# Patient Record
Sex: MALE | Race: WHITE | NOT HISPANIC OR LATINO | URBAN - METROPOLITAN AREA
[De-identification: names, ages, dates, MRNs, and addresses within clinical notes are randomized per-mention and may not be internally consistent; named-entity substitution may affect disease eponyms.]

---
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
## 2018-07-31 ENCOUNTER — OUTPATIENT (OUTPATIENT)
Dept: OUTPATIENT SERVICES | Facility: HOSPITAL | Age: 18
LOS: 1 days | Discharge: HOME | End: 2018-07-31

## 2018-07-31 DIAGNOSIS — Z00.00 ENCOUNTER FOR GENERAL ADULT MEDICAL EXAMINATION WITHOUT ABNORMAL FINDINGS: ICD-10-CM

## 2025-04-02 VITALS
TEMPERATURE: 98 F | RESPIRATION RATE: 20 BRPM | WEIGHT: 214.95 LBS | SYSTOLIC BLOOD PRESSURE: 142 MMHG | DIASTOLIC BLOOD PRESSURE: 96 MMHG | OXYGEN SATURATION: 99 % | HEART RATE: 119 BPM

## 2025-04-02 PROCEDURE — 99285 EMERGENCY DEPT VISIT HI MDM: CPT

## 2025-04-02 NOTE — ED ADULT TRIAGE NOTE - CHIEF COMPLAINT QUOTE
BIBEMS from home for first time grand mal seizure. as per family pt. fell out of bed, -ac, -HT.  no hx of seizures. denies any drug or alcohol use

## 2025-04-03 ENCOUNTER — OUTPATIENT (OUTPATIENT)
Dept: EMERGENCY DEPT | Facility: HOSPITAL | Age: 25
LOS: 1 days | Discharge: HOME | DRG: 101 | End: 2025-04-03
Payer: MEDICAID

## 2025-04-03 DIAGNOSIS — R56.9 UNSPECIFIED CONVULSIONS: ICD-10-CM

## 2025-04-03 LAB
ALBUMIN SERPL ELPH-MCNC: 4.7 G/DL — SIGNIFICANT CHANGE UP (ref 3.5–5.2)
ALBUMIN SERPL ELPH-MCNC: 4.7 G/DL — SIGNIFICANT CHANGE UP (ref 3.5–5.2)
ALP SERPL-CCNC: 58 U/L — SIGNIFICANT CHANGE UP (ref 30–115)
ALP SERPL-CCNC: 62 U/L — SIGNIFICANT CHANGE UP (ref 30–115)
ALT FLD-CCNC: 19 U/L — SIGNIFICANT CHANGE UP (ref 0–41)
ALT FLD-CCNC: 20 U/L — SIGNIFICANT CHANGE UP (ref 0–41)
ANION GAP SERPL CALC-SCNC: 10 MMOL/L — SIGNIFICANT CHANGE UP (ref 7–14)
ANION GAP SERPL CALC-SCNC: 14 MMOL/L — SIGNIFICANT CHANGE UP (ref 7–14)
AST SERPL-CCNC: 18 U/L — SIGNIFICANT CHANGE UP (ref 0–41)
AST SERPL-CCNC: 21 U/L — SIGNIFICANT CHANGE UP (ref 0–41)
BASOPHILS # BLD AUTO: 0.04 K/UL — SIGNIFICANT CHANGE UP (ref 0–0.2)
BASOPHILS # BLD AUTO: 0.04 K/UL — SIGNIFICANT CHANGE UP (ref 0–0.2)
BASOPHILS NFR BLD AUTO: 0.3 % — SIGNIFICANT CHANGE UP (ref 0–1)
BASOPHILS NFR BLD AUTO: 0.6 % — SIGNIFICANT CHANGE UP (ref 0–1)
BILIRUB SERPL-MCNC: 0.7 MG/DL — SIGNIFICANT CHANGE UP (ref 0.2–1.2)
BILIRUB SERPL-MCNC: 0.8 MG/DL — SIGNIFICANT CHANGE UP (ref 0.2–1.2)
BUN SERPL-MCNC: 12 MG/DL — SIGNIFICANT CHANGE UP (ref 10–20)
BUN SERPL-MCNC: 8 MG/DL — LOW (ref 10–20)
CALCIUM SERPL-MCNC: 8.8 MG/DL — SIGNIFICANT CHANGE UP (ref 8.4–10.5)
CALCIUM SERPL-MCNC: 9 MG/DL — SIGNIFICANT CHANGE UP (ref 8.4–10.5)
CHLORIDE SERPL-SCNC: 104 MMOL/L — SIGNIFICANT CHANGE UP (ref 98–110)
CHLORIDE SERPL-SCNC: 106 MMOL/L — SIGNIFICANT CHANGE UP (ref 98–110)
CK SERPL-CCNC: 1533 U/L — HIGH (ref 0–225)
CO2 SERPL-SCNC: 21 MMOL/L — SIGNIFICANT CHANGE UP (ref 17–32)
CO2 SERPL-SCNC: 26 MMOL/L — SIGNIFICANT CHANGE UP (ref 17–32)
CREAT SERPL-MCNC: 0.8 MG/DL — SIGNIFICANT CHANGE UP (ref 0.7–1.5)
CREAT SERPL-MCNC: 0.9 MG/DL — SIGNIFICANT CHANGE UP (ref 0.7–1.5)
DRUG SCREEN 1, URINE RESULT: SIGNIFICANT CHANGE UP
EGFR: 121 ML/MIN/1.73M2 — SIGNIFICANT CHANGE UP
EGFR: 121 ML/MIN/1.73M2 — SIGNIFICANT CHANGE UP
EGFR: 125 ML/MIN/1.73M2 — SIGNIFICANT CHANGE UP
EGFR: 125 ML/MIN/1.73M2 — SIGNIFICANT CHANGE UP
EOSINOPHIL # BLD AUTO: 0.02 K/UL — SIGNIFICANT CHANGE UP (ref 0–0.7)
EOSINOPHIL # BLD AUTO: 0.02 K/UL — SIGNIFICANT CHANGE UP (ref 0–0.7)
EOSINOPHIL NFR BLD AUTO: 0.2 % — SIGNIFICANT CHANGE UP (ref 0–8)
EOSINOPHIL NFR BLD AUTO: 0.3 % — SIGNIFICANT CHANGE UP (ref 0–8)
FERRITIN SERPL-MCNC: 253 NG/ML — SIGNIFICANT CHANGE UP (ref 30–400)
FLUAV AG NPH QL: SIGNIFICANT CHANGE UP
FLUBV AG NPH QL: SIGNIFICANT CHANGE UP
GAS PNL BLDV: SIGNIFICANT CHANGE UP
GLUCOSE SERPL-MCNC: 90 MG/DL — SIGNIFICANT CHANGE UP (ref 70–99)
GLUCOSE SERPL-MCNC: 99 MG/DL — SIGNIFICANT CHANGE UP (ref 70–99)
HCT VFR BLD CALC: 40 % — LOW (ref 42–52)
HCT VFR BLD CALC: 41.4 % — LOW (ref 42–52)
HGB BLD-MCNC: 12.4 G/DL — LOW (ref 14–18)
HGB BLD-MCNC: 13.1 G/DL — LOW (ref 14–18)
IMM GRANULOCYTES NFR BLD AUTO: 0.3 % — SIGNIFICANT CHANGE UP (ref 0.1–0.3)
IMM GRANULOCYTES NFR BLD AUTO: 0.4 % — HIGH (ref 0.1–0.3)
IRON SATN MFR SERPL: 12 % — LOW (ref 15–50)
IRON SATN MFR SERPL: 34 UG/DL — LOW (ref 35–150)
LYMPHOCYTES # BLD AUTO: 0.61 K/UL — LOW (ref 1.2–3.4)
LYMPHOCYTES # BLD AUTO: 0.83 K/UL — LOW (ref 1.2–3.4)
LYMPHOCYTES # BLD AUTO: 11.6 % — LOW (ref 20.5–51.1)
LYMPHOCYTES # BLD AUTO: 5.3 % — LOW (ref 20.5–51.1)
MAGNESIUM SERPL-MCNC: 2.3 MG/DL — SIGNIFICANT CHANGE UP (ref 1.8–2.4)
MCHC RBC-ENTMCNC: 20.4 PG — LOW (ref 27–31)
MCHC RBC-ENTMCNC: 20.6 PG — LOW (ref 27–31)
MCHC RBC-ENTMCNC: 31 G/DL — LOW (ref 32–37)
MCHC RBC-ENTMCNC: 31.6 G/DL — LOW (ref 32–37)
MCV RBC AUTO: 65.1 FL — LOW (ref 80–94)
MCV RBC AUTO: 65.9 FL — LOW (ref 80–94)
MONOCYTES # BLD AUTO: 0.56 K/UL — SIGNIFICANT CHANGE UP (ref 0.1–0.6)
MONOCYTES # BLD AUTO: 0.61 K/UL — HIGH (ref 0.1–0.6)
MONOCYTES NFR BLD AUTO: 5.3 % — SIGNIFICANT CHANGE UP (ref 1.7–9.3)
MONOCYTES NFR BLD AUTO: 7.8 % — SIGNIFICANT CHANGE UP (ref 1.7–9.3)
NEUTROPHILS # BLD AUTO: 10.28 K/UL — HIGH (ref 1.4–6.5)
NEUTROPHILS # BLD AUTO: 5.68 K/UL — SIGNIFICANT CHANGE UP (ref 1.4–6.5)
NEUTROPHILS NFR BLD AUTO: 79.4 % — HIGH (ref 42.2–75.2)
NEUTROPHILS NFR BLD AUTO: 88.5 % — HIGH (ref 42.2–75.2)
NRBC BLD AUTO-RTO: 0 /100 WBCS — SIGNIFICANT CHANGE UP (ref 0–0)
NRBC BLD AUTO-RTO: 0 /100 WBCS — SIGNIFICANT CHANGE UP (ref 0–0)
PCP SPEC-MCNC: SIGNIFICANT CHANGE UP
PLATELET # BLD AUTO: 198 K/UL — SIGNIFICANT CHANGE UP (ref 130–400)
PLATELET # BLD AUTO: 257 K/UL — SIGNIFICANT CHANGE UP (ref 130–400)
PMV BLD: 11.8 FL — HIGH (ref 7.4–10.4)
PMV BLD: SIGNIFICANT CHANGE UP (ref 7.4–10.4)
POTASSIUM SERPL-MCNC: 4.1 MMOL/L — SIGNIFICANT CHANGE UP (ref 3.5–5)
POTASSIUM SERPL-MCNC: 4.2 MMOL/L — SIGNIFICANT CHANGE UP (ref 3.5–5)
POTASSIUM SERPL-SCNC: 4.1 MMOL/L — SIGNIFICANT CHANGE UP (ref 3.5–5)
POTASSIUM SERPL-SCNC: 4.2 MMOL/L — SIGNIFICANT CHANGE UP (ref 3.5–5)
PROT SERPL-MCNC: 6.9 G/DL — SIGNIFICANT CHANGE UP (ref 6–8)
PROT SERPL-MCNC: 7 G/DL — SIGNIFICANT CHANGE UP (ref 6–8)
RBC # BLD: 6.07 M/UL — SIGNIFICANT CHANGE UP (ref 4.7–6.1)
RBC # BLD: 6.36 M/UL — HIGH (ref 4.7–6.1)
RBC # FLD: 14.3 % — SIGNIFICANT CHANGE UP (ref 11.5–14.5)
RBC # FLD: 15.6 % — HIGH (ref 11.5–14.5)
RSV RNA NPH QL NAA+NON-PROBE: SIGNIFICANT CHANGE UP
SARS-COV-2 RNA SPEC QL NAA+PROBE: SIGNIFICANT CHANGE UP
SODIUM SERPL-SCNC: 139 MMOL/L — SIGNIFICANT CHANGE UP (ref 135–146)
SODIUM SERPL-SCNC: 142 MMOL/L — SIGNIFICANT CHANGE UP (ref 135–146)
SOURCE RESPIRATORY: SIGNIFICANT CHANGE UP
TIBC SERPL-MCNC: 288 UG/DL — SIGNIFICANT CHANGE UP (ref 220–430)
TSH SERPL-MCNC: 1.27 UIU/ML — SIGNIFICANT CHANGE UP (ref 0.27–4.2)
UIBC SERPL-MCNC: 254 UG/DL — SIGNIFICANT CHANGE UP (ref 110–370)
WBC # BLD: 11.61 K/UL — HIGH (ref 4.8–10.8)
WBC # BLD: 7.15 K/UL — SIGNIFICANT CHANGE UP (ref 4.8–10.8)
WBC # FLD AUTO: 11.61 K/UL — HIGH (ref 4.8–10.8)
WBC # FLD AUTO: 7.15 K/UL — SIGNIFICANT CHANGE UP (ref 4.8–10.8)

## 2025-04-03 PROCEDURE — 99222 1ST HOSP IP/OBS MODERATE 55: CPT

## 2025-04-03 PROCEDURE — 70450 CT HEAD/BRAIN W/O DYE: CPT | Mod: 26

## 2025-04-03 PROCEDURE — 71045 X-RAY EXAM CHEST 1 VIEW: CPT | Mod: 26

## 2025-04-03 PROCEDURE — 93010 ELECTROCARDIOGRAM REPORT: CPT

## 2025-04-03 RX ORDER — LORAZEPAM 4 MG/ML
2 VIAL (ML) INJECTION ONCE
Refills: 0 | Status: DISCONTINUED | OUTPATIENT
Start: 2025-04-03 | End: 2025-04-04

## 2025-04-03 RX ORDER — INFLUENZA A VIRUS A/IDAHO/07/2018 (H1N1) ANTIGEN (MDCK CELL DERIVED, PROPIOLACTONE INACTIVATED, INFLUENZA A VIRUS A/INDIANA/08/2018 (H3N2) ANTIGEN (MDCK CELL DERIVED, PROPIOLACTONE INACTIVATED), INFLUENZA B VIRUS B/SINGAPORE/INFTT-16-0610/2016 ANTIGEN (MDCK CELL DERIVED, PROPIOLACTONE INACTIVATED), INFLUENZA B VIRUS B/IOWA/06/2017 ANTIGEN (MDCK CELL DERIVED, PROPIOLACTONE INACTIVATED) 15; 15; 15; 15 UG/.5ML; UG/.5ML; UG/.5ML; UG/.5ML
0.5 INJECTION, SUSPENSION INTRAMUSCULAR ONCE
Refills: 0 | Status: COMPLETED | OUTPATIENT
Start: 2025-04-03 | End: 2025-04-03

## 2025-04-03 RX ADMIN — Medication 2000 MILLILITER(S): at 00:12

## 2025-04-03 NOTE — ED ADULT NURSE REASSESSMENT NOTE - NS ED NURSE REASSESS COMMENT FT1
pt a/o x4. family bedside. ADM to medicine. pt updated with POC. No events overnight. Suction set up bedside. Call bell within reach. Pending EEG.

## 2025-04-03 NOTE — H&P ADULT - ATTENDING COMMENTS
Patient seen and examined and agree with above except as noted.  Patients history, notes, labs, imaging, vital and meds reviewed personally.  No prior seizures, no family history of seizures.  Drinks socially and vapes daily including vapes marijuana  No focal abnormalities on neuro exam    Plan as above  Discussed that if workup shows any abnormalities then will need to avoid driving for 1 year and avoid bathing/swimming unattended  Works in a [employer]

## 2025-04-03 NOTE — ED PROVIDER NOTE - ATTENDING APP SHARED VISIT CONTRIBUTION OF CARE
I have personally performed a history and physical exam on this patient. I have personally directed the management of the patient.  Patient presents to ED for evaluation of seizure. Denies trauma. Denies cp/sob.   Vitals reviewed.   Patient is awake, alert, answering questions appropriately, appears comfortable and not in any distress.  Lungs: CTA, no wheezing, no crackles.  Heart: s1, s2, rrr, no M/G.  CNS: awake, alert, o x 3, no focal neurologic deficits.

## 2025-04-03 NOTE — PATIENT PROFILE ADULT - FALL HARM RISK - HARM RISK INTERVENTIONS
Communicate Risk of Fall with Harm to all staff/Monitor for mental status changes/Monitor gait and stability/Reinforce activity limits and safety measures with patient and family/Tailored Fall Risk Interventions/Use of alarms - bed, chair and/or voice tab/Visual Cue: Yellow wristband and red socks/Bed in lowest position, wheels locked, appropriate side rails in place/Call bell, personal items and telephone in reach/Instruct patient to call for assistance before getting out of bed or chair/Non-slip footwear when patient is out of bed/[place] to call system/Physically safe environment - no spills, clutter or unnecessary equipment/Purposeful Proactive Rounding/Room/bathroom lighting operational, light cord in reach

## 2025-04-03 NOTE — H&P ADULT - HISTORY OF PRESENT ILLNESS
24 year old man with no significant past medical history presenting for first time seizure while playing video game. According to mother and gf who provides most of the history, patient was playing video game when all of a sudden patient's family heard a loud abnormal sound coming from him followed by an "thud" sound. Patient was found of the floor convulsing, his whole body was shaking and then became stiff. The episode lasted for a few minutes and ems were called. When the seizure stopped, patient was confused and somewhat combative when waking up. Eventually was brought to the ED. Family denies any history of prior seizures, no family history of seizures, no history of head trauma, no history of CNS infections. Patient works in a [employer] and sleeps on average about 7 hrs per day. Denies any alcohol use, no rec drugs no smoking. Patient has 3 siblings who are all without any neurologic deficits.      Admitted to neurology. Patient refused EMU admission to [facility]

## 2025-04-03 NOTE — H&P ADULT - NSHPLABSRESULTS_GEN_ALL_CORE
LABS:                        13.1   11.61 )-----------( 257      ( 03 Apr 2025 00:32 )             41.4     04-03    139  |  104  |  12  ----------------------------<  99  4.2   |  21  |  0.8    Ca    9.0      03 Apr 2025 00:32  Mg     2.3     04-03    TPro  7.0  /  Alb  4.7  /  TBili  0.7  /  DBili  x   /  AST  18  /  ALT  20  /  AlkPhos  58  04-03

## 2025-04-03 NOTE — ED PROVIDER NOTE - CLINICAL SUMMARY MEDICAL DECISION MAKING FREE TEXT BOX
Patient remained hemodynamically stable during the course of ED stay. Discussed with patient about the results of the diagnostic studies. Neurology consulted obtained and as recommended, is admitted to neurology service for further evaluation and care.

## 2025-04-03 NOTE — ED PROVIDER NOTE - DIFFERENTIAL DIAGNOSIS
Electrolyte abnormalities, dehydration, JOSSELINE, hyperglycemia, hypoglycemia, symptomatic anemia.  r/o ICH;  r/o cardiac arrhythmia. Differential Diagnosis

## 2025-04-03 NOTE — H&P ADULT - ASSESSMENT
Keep K >4, Mg >2  Seizure & Fall precautions  Ativan 2mg IVP for seizures > 2mins.     Please call us if any questions or neurological changes.     Misc:  - Diet: Regular  - Activity: IAT  - Gi ppx: None  - DVT ppx: not indicated  - Code: Full    Dispo: from home 24 year old man with no significant past medical history presenting for first time seizure while playing video game. Admitted under neurology for further work up    #First time Seizure  - Obtain vEEG  - Obtain MR brain w/wo epilepsy protocol while inpatient  - Monitor off AED  - Obtain urine drug screen  - Obtain TSH  - Keep K >4, Mg >2  - Seizure & Fall precautions  - Ativan 2mg IVP for seizures > 2mins.     #Mild Microcytic anemia  - Send iron and ferritin levels    Misc:  - Diet: Regular  - Activity: IAT  - Gi ppx: None  - DVT ppx: not indicated  - Code: Full    Dispo: from home

## 2025-04-03 NOTE — H&P ADULT - NSHPPHYSICALEXAM_GEN_ALL_CORE
Neurological Exam:   Mental status: Awake, alert and oriented x3.  Recent and remote memory intact.  No dysarthria, no aphasia.    Cranial nerves: Pupils equally round and reactive to light, visual fields full, no nystagmus, extraocular muscles intact, V1 through V3 intact bilaterally and symmetric, face symmetric, hearing intact to finger rub, palate elevation symmetric, tongue was midline.  Motor:  5/5 b/l UE/LE.   strength 5/5.  Normal tone and bulk.  No abnormal movements.    Sensation: Intact to light touch, vibraiton, and pinprick.  Coordination: No dysmetria on finger-to-nose and heel-to-shin.  No dysdiadokinesia.  Reflexes: 2+ in bilateral UE/ 3+ LE, downgoing toes bilaterally.  Gait: deferred

## 2025-04-03 NOTE — ED PROVIDER NOTE - PROGRESS NOTE DETAILS
On my evaluation, patient family stated that, patient middle name is D ( Mary) and M as a middle initial is his father middle name. I notified the clerical staff about it and they will speak with patient.

## 2025-04-03 NOTE — ED PROVIDER NOTE - PHYSICAL EXAMINATION
GENERAL: Well-nourished, Well-developed. NAD.  HEAD: No visible or palpable bumps or hematomas. No ecchymosis behind ears B/L.  Eyes: PERRLA, EOMI.   ENMT: MMM. no tongue biting  CVS: Normal S1,S2. No murmurs appreciated on auscultation   RESP: No use of accessory muscles. Chest rise symmetrical with good expansion. Lungs clear to auscultation B/L. No wheezing, rales, or rhonchi auscultated.  Skin: Warm, Dry. No rashes or lesions. Good cap refill < 2 sec B/L.  EXT: Radial and pedal pulses present B/L. No calf tenderness or swelling B/L. No palpable cords. No pedal edema B/L.  Neuro: AA&O x 3. Speaking in full sentences. No slurring of speech. No facial droop. No tremors. Sensation grossly intact. Strength 5/5 B/L.

## 2025-04-03 NOTE — ED PROVIDER NOTE - EKG/XRAY ADDITIONAL INFORMATION
EKG reviewed and interpreted by me. EKG shows Sinus rhythm, intervals are in acceptable range, and no significant ST/T wave changes noted.  Chest X-rays reviewed and interpreted by me Dr. Burgess and shows no actue findings. No Pneumothorax, no free air, no effusions, and these findings discussed with patient.

## 2025-04-03 NOTE — ED PROVIDER NOTE - OBJECTIVE STATEMENT
25 yo M no sig pmhx presenting to the ED for evaluation of seizure tonight. pt was playing video games with his friend, had a witnessed tonic clonic like seizure, fell from bed and hit his head. Reporting shaking to upper and lower extremities with foaming at the mouth for about 4 minutes. as per family pt was confused after seizure for a few minutes. at this time pt states he feels back to normal, however can not remember what happened. denies any tongue biting or incontinence. denies fever, chills, chest pain, sob, abd pain, nausea, vomiting, headache, dizziness.

## 2025-04-04 VITALS
HEART RATE: 88 BPM | RESPIRATION RATE: 16 BRPM | SYSTOLIC BLOOD PRESSURE: 120 MMHG | OXYGEN SATURATION: 100 % | DIASTOLIC BLOOD PRESSURE: 83 MMHG

## 2025-04-04 PROCEDURE — 70553 MRI BRAIN STEM W/O & W/DYE: CPT | Mod: 26

## 2025-04-04 PROCEDURE — 99232 SBSQ HOSP IP/OBS MODERATE 35: CPT

## 2025-04-04 PROCEDURE — 76377 3D RENDER W/INTRP POSTPROCES: CPT | Mod: 26

## 2025-04-04 PROCEDURE — 95720 EEG PHY/QHP EA INCR W/VEEG: CPT

## 2025-04-04 RX ORDER — LEVETIRACETAM 10 MG/ML
1 INJECTION, SOLUTION INTRAVENOUS
Qty: 60 | Refills: 0
Start: 2025-04-04 | End: 2025-05-03

## 2025-04-04 RX ORDER — LEVETIRACETAM 10 MG/ML
1 INJECTION, SOLUTION INTRAVENOUS
Qty: 8 | Refills: 0
Start: 2025-04-04 | End: 2025-04-07

## 2025-04-04 RX ORDER — FERROUS SULFATE 137(45) MG
1 TABLET, EXTENDED RELEASE ORAL
Qty: 30 | Refills: 0
Start: 2025-04-04 | End: 2025-05-03

## 2025-04-04 RX ORDER — LEVETIRACETAM 10 MG/ML
500 INJECTION, SOLUTION INTRAVENOUS
Refills: 0 | Status: DISCONTINUED | OUTPATIENT
Start: 2025-04-04 | End: 2025-04-04

## 2025-04-04 RX ORDER — FERROUS SULFATE 137(45) MG
1 TABLET, EXTENDED RELEASE ORAL
Qty: 3 | Refills: 0
Start: 2025-04-04 | End: 2025-04-06

## 2025-04-04 RX ORDER — FERROUS SULFATE 137(45) MG
325 TABLET, EXTENDED RELEASE ORAL DAILY
Refills: 0 | Status: DISCONTINUED | OUTPATIENT
Start: 2025-04-04 | End: 2025-04-04

## 2025-04-04 RX ADMIN — LEVETIRACETAM 500 MILLIGRAM(S): 10 INJECTION, SOLUTION INTRAVENOUS at 15:39

## 2025-04-04 RX ADMIN — Medication 325 MILLIGRAM(S): at 15:37

## 2025-04-04 NOTE — DISCHARGE NOTE NURSING/CASE MANAGEMENT/SOCIAL WORK - NSDCPEFALRISK_GEN_ALL_CORE
For information on Fall & Injury Prevention, visit: [email or web address] OR  [email or web address] OR  https://www.cdc.gov/steadi/patient.html

## 2025-04-04 NOTE — DISCHARGE NOTE PROVIDER - NSDCCPCAREPLAN_GEN_ALL_CORE_FT
PRINCIPAL DISCHARGE DIAGNOSIS  Diagnosis: Seizure  Assessment and Plan of Treatment: You came to the hospital due to an episode of convulsions while playing video games. Your work up here shows activity in your brain that puts you at risk of having seizures. Your episode of convulsions are likely due to a seizure. You were started on keppra 500 mg twice a day to decrease risk of seizures. You were also started on iron supplement for iron deficiency anemia. Please follow up with the neurologist, Dr. Mac, within 2-4 weeks. Please follow up with your primary care provider within 2-4 weeks. If you notice any numbness, weakness, speech issues, vision issues, or confusion, or have another episode of convulsions, please return to the emergency room.

## 2025-04-04 NOTE — DISCHARGE NOTE PROVIDER - HOSPITAL COURSE
Hospital course:  24 year old man with no significant past medical history presenting for convulsion while playing a video game. He was admitted under neurology for further work up of possible seizure. His vEEG shows rare bifrontal discharges and other frontal and FP region interictal activity suggestive of risk of seizure activity. He was started on keppra 500 mg bid here. His brain MRI showed 2 nonhancing cysts in the cerebellum that is likely unrelated with his seizures but he should be followed up on outpatient. He was also found to have iron deficiency anemia and was started on iron supplements. He is now ready for discharge with outpatient neurology follow up.    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 No abnormal facies. Gross skin survey within normal limits.    Cognitive/Language: The patient is oriented to person, place, time and date. Recent and remote memory intact. Fund of knowledge is intact and normal. Language with normal repetition, comprehension and naming. Nondysarthric.    Eyes: VFF. EOMI w/o nystagmus or reported double vision. PERRL. No ptosis/weakness of eyelid closure.  Face: Facial sensation normal V1 - 3, no facial asymmetry.    Ears/Nose/Throat: Hearing grossly intact b/l. Palate elevates midline. Tongue and uvula midline.   Motor examination: Normal tone. No tenderness, twitching, tremors or involuntary movements.  Strength Exam (MRC scale): 5/5 BL UE and LE strength  Reflexes: 2+ b/l biceps, triceps, brachioradialis, patellar and Achilles reflexes. Plantar response downgoing b/l.  Sensory examination: Intact to light touch, temperature, and vibration in all extremities.  Cerebellum: FTN/HKS intact. No dysmetria or dysdiadochokinesia.    Gait: Narrow based and normal. Able to tiptoe, heel walk, and tandem gait. Negative Romberg    New medications on discharge: Keppra 500 mg bid, iron sulfate 325 mg daily  Labs to be followed up: None  Imaging to be done as outpatient: Repeat brain MRI in 3 months  Further outpatient workup: f/u with Dr. Tapia within 2-4 weeks   Hospital course:  24 year old man with no significant past medical history presenting for convulsion while playing a video game. He was admitted under neurology for further work up of possible seizure. His vEEG shows rare bifrontal discharges and other frontal and FP region interictal activity suggestive of risk of seizure activity. He was started on keppra 500 mg bid here. His brain MRI showed 2 nonhancing cysts in the cerebellum that is likely unrelated with his seizures but he should be followed up on outpatient. He was also found to have iron deficiency anemia and was started on iron supplements. He is now ready for discharge with outpatient neurology follow up.    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 No abnormal facies. Gross skin survey within normal limits.    Cognitive/Language: The patient is oriented to person, place, time and date. Recent and remote memory intact. Fund of knowledge is intact and normal. Language with normal repetition, comprehension and naming. Nondysarthric.    Eyes: VFF. EOMI w/o nystagmus or reported double vision. PERRL. No ptosis/weakness of eyelid closure.  Face: Facial sensation normal V1 - 3, no facial asymmetry.    Ears/Nose/Throat: Hearing grossly intact b/l. Palate elevates midline. Tongue and uvula midline.   Motor examination: Normal tone. No tenderness, twitching, tremors or involuntary movements.  Strength Exam (MRC scale): 5/5 BL UE and LE strength  Reflexes: 2+ b/l biceps, triceps, brachioradialis, patellar and Achilles reflexes. Plantar response downgoing b/l.  Sensory examination: Intact to light touch, temperature, and vibration in all extremities.  Cerebellum: FTN/HKS intact. No dysmetria or dysdiadochokinesia.    Gait: Narrow based and normal. Able to tiptoe, heel walk, and tandem gait. Negative Romberg    New medications on discharge: Keppra 500 mg bid, iron sulfate 325 mg daily  Labs to be followed up: None  Imaging to be done as outpatient: Repeat brain MRI in 3 months  Further outpatient workup: f/u with Dr. Tapia within 2-4 weeks    Attending Attestation: Patient seen and examined and agree with above except as noted.  Patients history, notes, labs, imaging, vital and meds reviewed personally.  VEEG showed abnormalities suggestive of seizures.  MRI showed cerebellar lesions on unclear etiology or chronicity.  Will need followup MRI in 3 months

## 2025-04-04 NOTE — PROGRESS NOTE ADULT - ASSESSMENT
24 year old man with no significant past medical history presenting for first time seizure while playing video game. Admitted under neurology for further work up    #First time Seizure  - Obtain vEEG  - Obtain MR brain w/wo epilepsy protocol while inpatient  - Monitor off AED  - Obtain urine drug screen  - Obtain TSH  - Keep K >4, Mg >2  - Seizure & Fall precautions  - Ativan 2mg IVP for seizures > 2mins.     #Mild Microcytic anemia  - Send iron and ferritin levels    Misc:  - Diet: Regular  - Activity: IAT  - Gi ppx: None  - DVT ppx: not indicated  - Code: Full    Dispo: Home, possibly today 24 year old man with no significant past medical history presenting for first time seizure while playing video game. Admitted under neurology for further work up    #First time Seizure  - vEEG shows rare bifrontal discharges and other frontal and FP region interictal activity suggestive of risk of seizure activity  - MR brain w/wo epilepsy protocol negative  - Will start keppra 500 mg bid  - d/c vEEG  - Urine drug screen positive for THC, which patient did admit that he uses marijuana  - TSH WNL  - Keep K >4, Mg >2  - Seizure & Fall precautions  - Ativan 2mg IVP for seizures > 2mins    #Iron deficiency anemia  - Iron sat 12%, iron T 24, ferritin 253  - start iron PO supplementation    Misc:  - Diet: Regular  - Activity: IAT  - Gi ppx: None  - DVT ppx: not indicated  - Code: Full    Dispo: Home today

## 2025-04-04 NOTE — EEG REPORT - NS EEG TEXT BOX
Epilepsy Attending Note:     KARLOS KAPADIA    24y Male  MRN [id number]    Vital Signs Last 24 Hrs  T(C): 36.4 (04 Apr 2025 04:44), Max: 36.7 (03 Apr 2025 20:50)  T(F): 97.6 (04 Apr 2025 04:44), Max: 98 (03 Apr 2025 20:50)  HR: 83 (04 Apr 2025 04:44) (83 - 86)  BP: 122/69 (04 Apr 2025 04:44) (122/69 - 134/89)  BP(mean): 104 (03 Apr 2025 20:50) (104 - 104)  RR: 19 (04 Apr 2025 04:44) (18 - 19)  SpO2: 98% (04 Apr 2025 04:44) (97% - 98%)    Parameters below as of 04 Apr 2025 04:44  Patient On (Oxygen Delivery Method): room air                              12.4   7.15  )-----------( 198      ( 03 Apr 2025 11:56 )             40.0       04-03    142  |  106  |  8[L]  ----------------------------<  90  4.1   |  26  |  0.9    Ca    8.8      03 Apr 2025 11:56  Mg     2.3     04-03    TPro  6.9  /  Alb  4.7  /  TBili  0.8  /  DBili  x   /  AST  21  /  ALT  19  /  AlkPhos  62  04-03      MEDICATIONS  (STANDING):  chlorhexidine 2% Cloths 1 Application(s) Topical <User Schedule>    MEDICATIONS  (PRN):  LORazepam   Injectable 2 milliGRAM(s) IV Push once PRN Seizure Activity            VEEG in the last 24 hours:    Background--------------- The recording over the initial  few hours of the recording showed a burst and attenuation pattern . Bursts lasting for 2-4 seconds and consisting of semirhythmic sharply contoured theta/ notched theta / low amplitude spike and aftergoing slow                                        discharges more prominently presented over the frontal and FP region. Gradually the back ground became more continues  and showing rare bifrontal  discharges     Focal and generalized slowing------------- 1-generalized slowing that normalized 2- improved bifrontal slowing    Interictal activity----as above    Events-------none    Seizures------none    Impression: abnormal as above    Plan - as/neurology

## 2025-04-04 NOTE — DISCHARGE NOTE NURSING/CASE MANAGEMENT/SOCIAL WORK - PATIENT PORTAL LINK FT
You can access the FollowMyHealth Patient Portal offered by [facility] by registering at the following website: [email or web address]. By joining [employer]’s FollowMyHealth portal, you will also be able to view your health information using other applications (apps) compatible with our system.

## 2025-04-04 NOTE — DISCHARGE NOTE NURSING/CASE MANAGEMENT/SOCIAL WORK - FINANCIAL ASSISTANCE
[facility] provides services at a reduced cost to those who are determined to be eligible through [facility]’s financial assistance program. Information regarding [facility]’s financial assistance program can be found by going to [email or web address] or by calling [phone].

## 2025-04-04 NOTE — DISCHARGE NOTE PROVIDER - NSDCMRMEDTOKEN_GEN_ALL_CORE_FT
ferrous sulfate 325 mg (65 mg elemental iron) oral tablet: 1 tab(s) orally once a day  ferrous sulfate 325 mg (65 mg elemental iron) oral tablet: 1 tab(s) orally once a day  Keppra 500 mg oral tablet: 1 tab(s) orally 2 times a day  levETIRAcetam 500 mg oral tablet: 1 tab(s) orally 2 times a day

## 2025-04-04 NOTE — PROGRESS NOTE ADULT - SUBJECTIVE AND OBJECTIVE BOX
----------Daily Progress Note----------    Neurology Progress Note    INTERVAL HOSPITAL COURSE / OVERNIGHT EVENTS:      <<<<<PAST MEDICAL & SURGICAL HISTORY>>>>>  No pertinent past medical history    No significant past surgical history      ALLERGIES  No Known Allergies      Home Medications:        MEDICATIONS  STANDING MEDICATIONS  influenza   Vaccine 0.5 milliLiter(s) IntraMuscular once    PRN MEDICATIONS  LORazepam   Injectable 2 milliGRAM(s) IV Push once PRN    VITALS:  T(F): 97.6  HR: 83  BP: 122/69  RR: 19  SpO2: 98%    <<<<<NEURO EXAM>>>>>  General:  Appearance is consistent with chronologic age.  No abnormal facies.  Gross skin survey within normal limits.    Cognitive/Language:  The patient is oriented to person, place, time and date.  Recent and remote memory intact.  Fund of knowledge is intact and normal.  Language with normal repetition, comprehension and naming.  Nondysarthric.    Eyes: intact VA, VFF.  EOMI w/o nystagmus, skew or reported double vision.  PERRL.  No ptosis/weakness of eyelid closure.    Face:  Facial sensation normal V1 - 3, no facial asymmetry.    Ears/Nose/Throat:  Hearing grossly intact b/l.  Palate elevates midline.  Tongue and uvula midline.   Motor examination:   Normal tone, bulk and range of motion.  No tenderness, twitching, tremors or involuntary movements.  Strength Exam (MRC scale): 5/5 BL Shoulder abduction, Shoulder elevation, Elbow flexion, Elbow elevation, Wrist extension, Wrist flexion, Wrist Abduction, Wrist Adduction, Hand  strength, Hip flexion, Hip abduction, Hip adduction, Knee flexion, Knee extension, Ankle plantarflexion, Ankle dorsiflexion, Ankle inversion, Ankle eversion  Reflexes:   2+ b/l biceps, triceps, brachioradialis, patella and Achilles.  Plantar response downgoing b/l.  Jaw jerk, Stephen, clonus absent.  Sensory examination: Intact to light touch and pinprick, pain, temperature and proprioception and vibration in all extremities.  Cerebellum: FTN/HKS intact. No dysmetria or dysdiadochokinesia.    Gait: Narrow based and normal.    <<<<<LABS>>>>>                        12.4   7.15  )-----------( 198      ( 03 Apr 2025 11:56 )             40.0     04-03    142  |  106  |  8[L]  ----------------------------<  90  4.1   |  26  |  0.9    Ca    8.8      03 Apr 2025 11:56  Mg     2.3     04-03    TPro  6.9  /  Alb  4.7  /  TBili  0.8  /  DBili  x   /  AST  21  /  ALT  19  /  AlkPhos  62  04-03    ----------------------------------------------------------------------------------------------------------------------------------------------------------------------------------------------- ----------Daily Progress Note----------    Neurology Progress Note    INTERVAL HOSPITAL COURSE / OVERNIGHT EVENTS:  No overnight events, denies any new shaking episodes or new issues    <<<<<PAST MEDICAL & SURGICAL HISTORY>>>>>  No pertinent past medical history    No significant past surgical history      ALLERGIES  No Known Allergies      Home Medications:        MEDICATIONS  STANDING MEDICATIONS  influenza   Vaccine 0.5 milliLiter(s) IntraMuscular once    PRN MEDICATIONS  LORazepam   Injectable 2 milliGRAM(s) IV Push once PRN    VITALS:  T(F): 97.6  HR: 83  BP: 122/69  RR: 19  SpO2: 98%    <<<<<NEURO EXAM>>>>>  General: Appearance is consistent with chronologic age. No abnormal facies. Gross skin survey within normal limits.    Cognitive/Language: The patient is oriented to person, place, time and date. Recent and remote memory intact. Fund of knowledge is intact and normal. Language with normal repetition, comprehension and naming. Nondysarthric.    Eyes: VFF. EOMI w/o nystagmus or reported double vision. PERRL. No ptosis/weakness of eyelid closure.  Face: Facial sensation normal V1 - 3, no facial asymmetry.    Ears/Nose/Throat: Hearing grossly intact b/l. Palate elevates midline. Tongue and uvula midline.   Motor examination: Normal tone. No tenderness, twitching, tremors or involuntary movements.  Strength Exam (MRC scale): 5/5 BL UE and LE strength  Reflexes: 2+ b/l biceps, triceps, brachioradialis, patellar and Achilles reflexes. Plantar response downgoing b/l.  Sensory examination: Intact to light touch, temperature, and vibration in all extremities.  Cerebellum: FTN/HKS intact. No dysmetria or dysdiadochokinesia.    Gait: Narrow based and normal. Able to tiptoe, heel walk, and tandem gait. Negative Romberg    <<<<<LABS>>>>>                        12.4   7.15  )-----------( 198      ( 03 Apr 2025 11:56 )             40.0     04-03    142  |  106  |  8[L]  ----------------------------<  90  4.1   |  26  |  0.9    Ca    8.8      03 Apr 2025 11:56  Mg     2.3     04-03    TPro  6.9  /  Alb  4.7  /  TBili  0.8  /  DBili  x   /  AST  21  /  ALT  19  /  AlkPhos  62  04-03    -----------------------------------------------------------------------------------------------------------------------------------------------------------------------------------------------

## 2025-04-04 NOTE — DISCHARGE NOTE PROVIDER - CARE PROVIDER_API CALL
Jayden Mac  Neurology  [address] [place] [address], [address]  [place] [address]  Phone: [phone]  Fax: [phone]  Follow Up Time: 2 weeks

## 2025-04-16 DIAGNOSIS — R56.9 UNSPECIFIED CONVULSIONS: ICD-10-CM
